# Patient Record
Sex: FEMALE | Race: BLACK OR AFRICAN AMERICAN | NOT HISPANIC OR LATINO | URBAN - METROPOLITAN AREA
[De-identification: names, ages, dates, MRNs, and addresses within clinical notes are randomized per-mention and may not be internally consistent; named-entity substitution may affect disease eponyms.]

---

## 2021-08-04 ENCOUNTER — EMERGENCY (EMERGENCY)
Facility: HOSPITAL | Age: 61
LOS: 1 days | Discharge: HOME | End: 2021-08-04
Attending: EMERGENCY MEDICINE | Admitting: EMERGENCY MEDICINE
Payer: COMMERCIAL

## 2021-08-04 VITALS
OXYGEN SATURATION: 100 % | SYSTOLIC BLOOD PRESSURE: 115 MMHG | DIASTOLIC BLOOD PRESSURE: 81 MMHG | RESPIRATION RATE: 18 BRPM | TEMPERATURE: 98 F | HEART RATE: 71 BPM

## 2021-08-04 LAB
ALBUMIN SERPL ELPH-MCNC: 4.2 G/DL — SIGNIFICANT CHANGE UP (ref 3.3–5)
ALP SERPL-CCNC: 78 U/L — SIGNIFICANT CHANGE UP (ref 40–120)
ALT FLD-CCNC: 21 U/L — SIGNIFICANT CHANGE UP (ref 4–33)
ANION GAP SERPL CALC-SCNC: 13 MMOL/L — SIGNIFICANT CHANGE UP (ref 7–14)
APTT BLD: 31.2 SEC — SIGNIFICANT CHANGE UP (ref 27–36.3)
AST SERPL-CCNC: 23 U/L — SIGNIFICANT CHANGE UP (ref 4–32)
BILIRUB SERPL-MCNC: 0.3 MG/DL — SIGNIFICANT CHANGE UP (ref 0.2–1.2)
BLD GP AB SCN SERPL QL: NEGATIVE — SIGNIFICANT CHANGE UP
BUN SERPL-MCNC: 16 MG/DL — SIGNIFICANT CHANGE UP (ref 7–23)
CALCIUM SERPL-MCNC: 9.2 MG/DL — SIGNIFICANT CHANGE UP (ref 8.4–10.5)
CHLORIDE SERPL-SCNC: 102 MMOL/L — SIGNIFICANT CHANGE UP (ref 98–107)
CO2 SERPL-SCNC: 25 MMOL/L — SIGNIFICANT CHANGE UP (ref 22–31)
CREAT SERPL-MCNC: 0.84 MG/DL — SIGNIFICANT CHANGE UP (ref 0.5–1.3)
GLUCOSE SERPL-MCNC: 105 MG/DL — HIGH (ref 70–99)
HCT VFR BLD CALC: 39.1 % — SIGNIFICANT CHANGE UP (ref 34.5–45)
HGB BLD-MCNC: 13.1 G/DL — SIGNIFICANT CHANGE UP (ref 11.5–15.5)
INR BLD: 1.05 RATIO — SIGNIFICANT CHANGE UP (ref 0.88–1.16)
MCHC RBC-ENTMCNC: 24.9 PG — LOW (ref 27–34)
MCHC RBC-ENTMCNC: 33.5 GM/DL — SIGNIFICANT CHANGE UP (ref 32–36)
MCV RBC AUTO: 74.2 FL — LOW (ref 80–100)
NRBC # BLD: 0 /100 WBCS — SIGNIFICANT CHANGE UP
NRBC # FLD: 0 K/UL — SIGNIFICANT CHANGE UP
PLATELET # BLD AUTO: 295 K/UL — SIGNIFICANT CHANGE UP (ref 150–400)
POTASSIUM SERPL-MCNC: 3.7 MMOL/L — SIGNIFICANT CHANGE UP (ref 3.5–5.3)
POTASSIUM SERPL-SCNC: 3.7 MMOL/L — SIGNIFICANT CHANGE UP (ref 3.5–5.3)
PROT SERPL-MCNC: 7.8 G/DL — SIGNIFICANT CHANGE UP (ref 6–8.3)
PROTHROM AB SERPL-ACNC: 11.9 SEC — SIGNIFICANT CHANGE UP (ref 10.6–13.6)
RBC # BLD: 5.27 M/UL — HIGH (ref 3.8–5.2)
RBC # FLD: 15 % — HIGH (ref 10.3–14.5)
RH IG SCN BLD-IMP: POSITIVE — SIGNIFICANT CHANGE UP
RH IG SCN BLD-IMP: POSITIVE — SIGNIFICANT CHANGE UP
SODIUM SERPL-SCNC: 140 MMOL/L — SIGNIFICANT CHANGE UP (ref 135–145)
WBC # BLD: 8.42 K/UL — SIGNIFICANT CHANGE UP (ref 3.8–10.5)
WBC # FLD AUTO: 8.42 K/UL — SIGNIFICANT CHANGE UP (ref 3.8–10.5)

## 2021-08-04 PROCEDURE — 73110 X-RAY EXAM OF WRIST: CPT | Mod: 26,RT

## 2021-08-04 PROCEDURE — 99285 EMERGENCY DEPT VISIT HI MDM: CPT

## 2021-08-04 PROCEDURE — 73130 X-RAY EXAM OF HAND: CPT | Mod: 26,RT

## 2021-08-04 RX ORDER — ACETAMINOPHEN 500 MG
975 TABLET ORAL ONCE
Refills: 0 | Status: COMPLETED | OUTPATIENT
Start: 2021-08-04 | End: 2021-08-04

## 2021-08-04 RX ORDER — CEPHALEXIN 500 MG
1 CAPSULE ORAL
Qty: 28 | Refills: 0
Start: 2021-08-04 | End: 2021-08-10

## 2021-08-04 RX ADMIN — Medication 975 MILLIGRAM(S): at 16:20

## 2021-08-04 NOTE — ED PROVIDER NOTE - PHYSICAL EXAMINATION
GENERAL: well appearing in no acute distress, non-toxic appearing  HEAD: normocephalic, atraumatic  HEENT: normal conjunctiva, oral mucosa moist, uvula midline, no tonsilar exudates, neck supple, no JVD  CARDIAC: regular rate and rhythm, normal S1S2, no appreciable murmurs, 2+ pulses in UE/LE b/l  PULM: normal breath sounds, clear to ascultation bilaterally, no rales, rhonchi, wheezing  GI: abdomen nondistended, soft, nontender, no guarding, rebound tenderness  : no CVA tenderness b/l, no suprapubic tenderness  NEURO: left thumb numbness,, CN2-12 intact, normal speech, PERRLA, EOMI, normal gait, AAOx3  MSK: no peripheral edema, no calf tenderness b/l  SKIN: left palm laceration, approx 3x4 cm with deep tissue exposed.   PSYCH: appropriate mood and affect

## 2021-08-04 NOTE — ED PROVIDER NOTE - ATTENDING CONTRIBUTION TO CARE
Grover: I have seen and examined the patient face to face, have reviewed and addended the HPI, PE and a/p as necessary.     59 yo F with no PMH a/w R palm laceration after accidentally dropping a glass bottle and landing onto the piece of glass after slipping.  Pt reports L thumb numbness.  Denies LOC, dizziness, chest pain, shortness of breath, head trauma, nausea or vomiting.     GEN - NAD; well appearing; A+O x3; non-toxic appearing  CARD -s1s2, RRR, no M,G,R;   PULM - CTA b/l, symmetric breath sounds;   ABD -  +BS, ND, NT, soft, no guarding, no rebound, no masses;   BACK - no CVA tenderness, Normal  spine;   EXT - symmetric pulses, 2+ dp, capillary refill < 2 seconds, no cyanosis, no edema; SKIN: left palm laceration, approx 3x4 cm with deep tissue exposed, thenar mm noted to be exposed with decreased sensation over the thumb  NEURO - no focal neuro deficits, no slurred speech    59 yo F with no PMH a/w R palm laceration after accidentally dropping a glass bottle and landing onto the piece of glass after slipping. Hand surgery eval given possible nerve injury.

## 2021-08-04 NOTE — ED PROVIDER NOTE - PATIENT PORTAL LINK FT
You can access the FollowMyHealth Patient Portal offered by [facility] by registering at the following website: [email or web address]. By joining [employer]’s FollowMyHealth portal, you will also be able to view your health information using other applications (apps) compatible with our system.

## 2021-08-04 NOTE — ED PROVIDER NOTE - PROGRESS NOTE DETAILS
Hand Surgery consult seen. Wound will require elective operative repair. Will get pre-op work-up sent.

## 2021-08-04 NOTE — ED PROVIDER NOTE - CLINICAL SUMMARY MEDICAL DECISION MAKING FREE TEXT BOX
60F with no PMHx presents with left palm laceration. Glass juice bottle fell and broke, patient slipped on juice, fell onto a piece of glass with her out-stretched hand.  Denies headstrike, LOC, dizziness. Large laceration likely will require Hand surgery consult, r/o fracture, r/o foreign body, will get imaging, give pain meds, call Hand consult, and reassess. no (get order)

## 2021-08-04 NOTE — ED PROVIDER NOTE - NS ED ROS FT
General: denies fever, chills  HENT: denies nasal congestion, rhinorrhea  Eyes: denies visual changes, blurred vision  CV: denies chest pain, palpitations  Resp: denies difficulty breathing, cough  Abdominal: denies nausea, vomiting, diarrhea, abdominal pain  : denies urinary pain or discharge  MSK: denies muscle aches, leg swelling  Neuro: denies headaches, + numbness of left thumb, denies tingling  Skin: large laceration to left palm with pain

## 2021-08-04 NOTE — ED ADULT NURSE NOTE - OBJECTIVE STATEMENT
Pt received to room 5 presents with rt hand laceration s/p mechanical fall in kitchen. 20G Iv placed in left arm, labs drawn and sent. will continue to monitor.

## 2021-08-04 NOTE — ED PROVIDER NOTE - CARE PROVIDER_API CALL
Barak Dee)  Plastic Surgery  [address]  [place] [address]  Phone: [phone]  Fax: [phone]  Follow Up Time: 1-3 Days

## 2021-08-04 NOTE — ED PROVIDER NOTE - NSFOLLOWUPINSTRUCTIONS_ED_ALL_ED_FT
Please follow up with your Hand Surgeon Dr. Barak Dee. Please call the number below to schedule your appointment.     Laceration    A laceration is a cut that goes through all of the layers of the skin and into the tissue that is right under the skin.     SEEK IMMEDIATE MEDICAL CARE IF YOU HAVE ANY OF THE FOLLOWING SYMPTOMS: swelling around the wound, worsening pain, drainage from the wound, red streaking going away from your wound, inability to move finger or toe near the laceration, or discoloration of skin near the laceration.

## 2021-08-04 NOTE — ED ADULT TRIAGE NOTE - CHIEF COMPLAINT QUOTE
Pt states cut her the palm of her right hand with glass. Pt with deep laceration to hand , pressure dressing applied over site.

## 2021-08-05 LAB — SARS-COV-2 RNA SPEC QL NAA+PROBE: SIGNIFICANT CHANGE UP

## 2021-08-06 ENCOUNTER — OUTPATIENT (OUTPATIENT)
Dept: OUTPATIENT SERVICES | Facility: HOSPITAL | Age: 61
LOS: 1 days | End: 2021-08-06
Payer: COMMERCIAL

## 2021-08-06 VITALS
TEMPERATURE: 98 F | DIASTOLIC BLOOD PRESSURE: 80 MMHG | WEIGHT: 268.96 LBS | SYSTOLIC BLOOD PRESSURE: 140 MMHG | HEIGHT: 63.5 IN | RESPIRATION RATE: 16 BRPM | HEART RATE: 70 BPM | OXYGEN SATURATION: 98 %

## 2021-08-06 DIAGNOSIS — Z98.891 HISTORY OF UTERINE SCAR FROM PREVIOUS SURGERY: Chronic | ICD-10-CM

## 2021-08-06 DIAGNOSIS — S61.011A LACERATION WITHOUT FOREIGN BODY OF RIGHT THUMB WITHOUT DAMAGE TO NAIL, INITIAL ENCOUNTER: ICD-10-CM

## 2021-08-06 DIAGNOSIS — Z01.818 ENCOUNTER FOR OTHER PREPROCEDURAL EXAMINATION: ICD-10-CM

## 2021-08-06 DIAGNOSIS — S66.021S: ICD-10-CM

## 2021-08-06 PROBLEM — Z00.00 ENCOUNTER FOR PREVENTIVE HEALTH EXAMINATION: Status: ACTIVE | Noted: 2021-08-06

## 2021-08-06 PROCEDURE — 93010 ELECTROCARDIOGRAM REPORT: CPT

## 2021-08-06 NOTE — H&P PST ADULT - NSICDXPROBLEM_GEN_ALL_CORE_FT
PROBLEM DIAGNOSES  Problem: Laceration of right thumb  Assessment and Plan: Pt. is scheduled for repair flexor tendon and microscopic nerve repair right thumb 8/10/21.  Pt. verbalized understanding of instructions.  Pt. has a BMI of 46.9.  OR booking notified.  Discussed with Dr. Nunez who confirmed that it can be done at [facility].

## 2021-08-06 NOTE — H&P PST ADULT - HISTORY OF PRESENT ILLNESS
Pt. is a 59 yo female that slipped and fell on glass resulting in a laceration to her right thumb affecting the nerve.

## 2021-08-06 NOTE — H&P PST ADULT - NSICDXPASTMEDICALHX_GEN_ALL_CORE_FT
PAST MEDICAL HISTORY:  Laceration of right thumb      PAST MEDICAL HISTORY:  Laceration of right thumb     Morbidly obese

## 2021-08-06 NOTE — H&P PST ADULT - NSICDXFAMILYHX_GEN_ALL_CORE_FT
FAMILY HISTORY:  Mother  Still living? No  Family history of colon cancer in mother, Age at diagnosis: Age Unknown

## 2021-08-07 ENCOUNTER — APPOINTMENT (OUTPATIENT)
Dept: DISASTER EMERGENCY | Facility: CLINIC | Age: 61
End: 2021-08-07

## 2021-08-08 LAB — SARS-COV-2 N GENE NPH QL NAA+PROBE: NOT DETECTED

## 2021-08-09 ENCOUNTER — TRANSCRIPTION ENCOUNTER (OUTPATIENT)
Age: 61
End: 2021-08-09

## 2021-08-09 VITALS
WEIGHT: 268.96 LBS | OXYGEN SATURATION: 98 % | SYSTOLIC BLOOD PRESSURE: 140 MMHG | HEART RATE: 70 BPM | TEMPERATURE: 98 F | DIASTOLIC BLOOD PRESSURE: 80 MMHG | HEIGHT: 63.5 IN | RESPIRATION RATE: 16 BRPM

## 2021-08-10 ENCOUNTER — OUTPATIENT (OUTPATIENT)
Dept: OUTPATIENT SERVICES | Facility: HOSPITAL | Age: 61
LOS: 1 days | Discharge: ANOTHER HOSPITAL | End: 2021-08-10

## 2021-08-10 VITALS
RESPIRATION RATE: 16 BRPM | HEART RATE: 60 BPM | SYSTOLIC BLOOD PRESSURE: 115 MMHG | TEMPERATURE: 97 F | DIASTOLIC BLOOD PRESSURE: 57 MMHG | OXYGEN SATURATION: 98 %

## 2021-08-10 DIAGNOSIS — S66.021S: ICD-10-CM

## 2021-08-10 DIAGNOSIS — Z98.891 HISTORY OF UTERINE SCAR FROM PREVIOUS SURGERY: Chronic | ICD-10-CM

## 2021-08-10 RX ORDER — OXYCODONE HYDROCHLORIDE 5 MG/1
1 TABLET ORAL
Qty: 12 | Refills: 0
Start: 2021-08-10 | End: 2021-08-11

## 2021-08-10 NOTE — ASU DISCHARGE PLAN (ADULT/PEDIATRIC) - NURSING INSTRUCTIONS
Keep hand elevated in order to decrease swelling and pain.  No creams, lotions, powders  or ointments to incision site.   Narcotic pain medication may cause nausea or constipation. Take medication with food. Increase fluids and fiber intake.

## 2021-08-10 NOTE — ASU DISCHARGE PLAN (ADULT/PEDIATRIC) - ASU DC SPECIAL INSTRUCTIONSFT
Please follow up with Dr. Dee on Friday 8/13/21 . You may call [phone] to schedule an appointment.     Please keep your dressing on, clean and dry. The splint will be removed at your follow up appointment.    Please take your pain medication sent to your pharmacy as directed for severe pain. You may also take Tylenol as directed for pain.

## 2021-08-10 NOTE — BRIEF OPERATIVE NOTE - NSICDXBRIEFPOSTOP_GEN_ALL_CORE_FT
POST-OP DIAGNOSIS:  Flexor tendon laceration, finger, open wound 10-Aug-2021 15:02:31  Deven Bills

## 2021-08-10 NOTE — ASU DISCHARGE PLAN (ADULT/PEDIATRIC) - CARE PROVIDER_API CALL
Barak Dee)  Plastic Surgery  [address]  [place] [address]  Phone: [phone]  Fax: [phone]  Follow Up Time:

## 2023-09-05 PROBLEM — E66.01 MORBID (SEVERE) OBESITY DUE TO EXCESS CALORIES: Chronic | Status: ACTIVE | Noted: 2021-08-06

## 2023-09-05 PROBLEM — S61.011A LACERATION WITHOUT FOREIGN BODY OF RIGHT THUMB WITHOUT DAMAGE TO NAIL, INITIAL ENCOUNTER: Chronic | Status: ACTIVE | Noted: 2021-08-06

## 2023-09-19 ENCOUNTER — APPOINTMENT (OUTPATIENT)
Dept: GYNECOLOGIC ONCOLOGY | Facility: CLINIC | Age: 63
End: 2023-09-19
Payer: MEDICAID

## 2023-09-19 VITALS
SYSTOLIC BLOOD PRESSURE: 139 MMHG | HEIGHT: 65 IN | DIASTOLIC BLOOD PRESSURE: 85 MMHG | HEART RATE: 91 BPM | WEIGHT: 271 LBS | BODY MASS INDEX: 45.15 KG/M2

## 2023-09-19 DIAGNOSIS — Z87.39 PERSONAL HISTORY OF OTHER DISEASES OF THE MUSCULOSKELETAL SYSTEM AND CONNECTIVE TISSUE: ICD-10-CM

## 2023-09-19 PROCEDURE — 58100 BIOPSY OF UTERUS LINING: CPT

## 2023-09-19 PROCEDURE — 99205 OFFICE O/P NEW HI 60 MIN: CPT | Mod: 25

## 2023-09-19 RX ORDER — CHROMIUM 200 MCG
TABLET ORAL
Refills: 0 | Status: ACTIVE | COMMUNITY

## 2023-09-29 LAB — CORE LAB BIOPSY: NORMAL

## 2023-09-30 ENCOUNTER — OUTPATIENT (OUTPATIENT)
Dept: OUTPATIENT SERVICES | Facility: HOSPITAL | Age: 63
LOS: 1 days | End: 2023-09-30
Payer: MEDICAID

## 2023-09-30 ENCOUNTER — APPOINTMENT (OUTPATIENT)
Dept: ULTRASOUND IMAGING | Facility: CLINIC | Age: 63
End: 2023-09-30
Payer: MEDICAID

## 2023-09-30 DIAGNOSIS — N95.0 POSTMENOPAUSAL BLEEDING: ICD-10-CM

## 2023-09-30 DIAGNOSIS — Z98.891 HISTORY OF UTERINE SCAR FROM PREVIOUS SURGERY: Chronic | ICD-10-CM

## 2023-09-30 DIAGNOSIS — R93.89 ABNORMAL FINDINGS ON DIAGNOSTIC IMAGING OF OTHER SPECIFIED BODY STRUCTURES: ICD-10-CM

## 2023-09-30 PROCEDURE — 76856 US EXAM PELVIC COMPLETE: CPT | Mod: 26

## 2023-09-30 PROCEDURE — 76830 TRANSVAGINAL US NON-OB: CPT | Mod: 26

## 2023-09-30 PROCEDURE — 76856 US EXAM PELVIC COMPLETE: CPT

## 2023-09-30 PROCEDURE — 76830 TRANSVAGINAL US NON-OB: CPT

## 2023-10-24 ENCOUNTER — APPOINTMENT (OUTPATIENT)
Dept: GYNECOLOGIC ONCOLOGY | Facility: CLINIC | Age: 63
End: 2023-10-24
Payer: MEDICAID

## 2023-10-24 VITALS
SYSTOLIC BLOOD PRESSURE: 162 MMHG | BODY MASS INDEX: 46.23 KG/M2 | HEIGHT: 65 IN | WEIGHT: 277.5 LBS | DIASTOLIC BLOOD PRESSURE: 121 MMHG | HEART RATE: 60 BPM

## 2023-10-24 DIAGNOSIS — R30.0 DYSURIA: ICD-10-CM

## 2023-10-24 PROCEDURE — 99214 OFFICE O/P EST MOD 30 MIN: CPT

## 2023-10-25 LAB
APPEARANCE: CLEAR
BACTERIA: NEGATIVE /HPF
BILIRUBIN URINE: NEGATIVE
BLOOD URINE: NEGATIVE
CAST: 0 /LPF
COLOR: YELLOW
EPITHELIAL CELLS: 5 /HPF
GLUCOSE QUALITATIVE U: NEGATIVE MG/DL
KETONES URINE: NEGATIVE MG/DL
LEUKOCYTE ESTERASE URINE: NEGATIVE
MICROSCOPIC-UA: NORMAL
NITRITE URINE: NEGATIVE
PH URINE: 6.5
PROTEIN URINE: NEGATIVE MG/DL
RED BLOOD CELLS URINE: 2 /HPF
SPECIFIC GRAVITY URINE: 1.03
UROBILINOGEN URINE: 1 MG/DL
WHITE BLOOD CELLS URINE: 1 /HPF

## 2023-11-22 ENCOUNTER — APPOINTMENT (OUTPATIENT)
Dept: GYNECOLOGIC ONCOLOGY | Facility: HOSPITAL | Age: 63
End: 2023-11-22

## 2023-11-29 ENCOUNTER — NON-APPOINTMENT (OUTPATIENT)
Age: 63
End: 2023-11-29

## 2023-12-19 ENCOUNTER — APPOINTMENT (OUTPATIENT)
Dept: GYNECOLOGIC ONCOLOGY | Facility: CLINIC | Age: 63
End: 2023-12-19

## 2024-01-02 ENCOUNTER — NON-APPOINTMENT (OUTPATIENT)
Age: 64
End: 2024-01-02

## 2024-01-02 ENCOUNTER — APPOINTMENT (OUTPATIENT)
Dept: GYNECOLOGIC ONCOLOGY | Facility: CLINIC | Age: 64
End: 2024-01-02

## 2024-01-02 NOTE — HISTORY OF PRESENT ILLNESS
[FreeTextEntry1] : GYN/Ref:  Dr. Strauss PCP:  Teetee Grubbs MD  Ms. Baig, 63 years old, referred for a thickened endometrium and postmenopausal bleeding. Pt had an isolated episode of PMB in . She followed up with her primary GYN who ordered a TVUS in  and identified thickened endometrium. She has been unable to follow up with her GYN due to scheduling difficulties and she was ultimately referred to GYN Onc for further management. At her initial visit an endometrial biopsy was performed and final pathology returned revealing findings consistent with an endometrial polyp. This pathology was discussed in detail with the patient as well as the limitations of endometrial biopsy. We discussed the recommendation for hysteroscopy dilation and curettage and endometrial polyp removal in detail. Today she is without any complaint. She denies any current postmenopausal bleeding.  Patient was scheduled for surgery on 2023.  However, she canceled.  I did call her to follow-up and there was no answer.  She presents today for further management.  Pathology: 2023: EMB: - Consistent with endometrial polyp. - Background of inactive endometrium. - Benign endocervical lining  Imagin2023 TVUS ([facility]) Uterus: 6.6 cm x 3.8 cm x 5.0 cm. The myometrium is heterogeneous. There appears be a pedunculated myoma measuring 5.2 x 5.4 x 5.3 cm Endometrium: 16 mm. The endometrium is thickened with cystic change. Right ovary: 2.3 cm x 1.6 cm x 1.3 cm. Within normal limits. Left ovary: 2.4 cm x 1.2 cm x 1.7 cm. Within normal limits. Fluid: None. IMPRESSION: Thickened endometrium measuring 16 mm in size with cystic change without focal abnormality. Tissue sampling should be considered. Heterogeneous myometrium with a pedunculated 5.2 x 5.4 x 5.3 cm myoma  4/3/2023 TVUS ([facility]) UTERUS: The uterus is anteverted measuring 7.1 x 3.8 x 5.0 cm (71 cc). There is a solid isoechoic mass seen contiguous to the uterine fundus and measuring 5.2 x 5.4 x 5.1 cm. Probable pedunculated fibroid. There is an intramural posterior lower uterine segment fibroid of 0.8 x 0.7 x 0.8 cm. ENDOMETRIUM: Abnormally thickened 0.9 cm in maximum thickness. Multiple tiny cystic spaces seen within the otherwise echogenic endometrium. RIGHT OVARY: Normal size, morphology and vascularity; 2.3 x 1.9 x 1.3 cm (3 cc).  LEFT OVARY: Not visualized. FREE FLUID: None. IMPRESSION:   1. Abnormally thickened endometrium for postmenopausal patient. Rule out endometrial pathology. 2. Solid mass seen contiguous to the uterine fundus. Probable pedunculated fibroid. Solid mass of other etiology cannot be excluded. Recommend correlation with pelvic MRI pre- and post-IV contrast. 3. Small intramural fibroid. 4. Left ovary was not visualized.  POB:   (2 vaginal deliveries; 1 ). Children are ages 42, 38 and 25 PGYN: Menarche age 12;  LMP age 45 (denies any prior pmb) PMH: b/l knee arthritis Meds: Vitamin D Allergies: Excedrin (facial swelling).  Pt states she can tolerate aspirin.  PSH:  right thumb tendon repair s/p tendon tear after falling on a glass jar she took out of the refrigerator;   .  Social Hx: Non-smoker; rarely consumes alcohol; denies marijuana or any other drugs.  Lives with daugther.   FH: Unknown family history (Pt is adopted)  Health Maintenance: Mammogram: 2023 BI-RADS 2 ([facility]) Colonoscopy:  (due now) PAP: 23 - Negative for intraepithelial lesion or malignancy.

## 2024-01-02 NOTE — ASSESSMENT
[FreeTextEntry1] : 63-year-old here for follow-up to discuss results and management plan for postmenopausal bleeding.

## 2024-01-02 NOTE — DISCUSSION/SUMMARY
[FreeTextEntry1] : - Patients history and work up to date reviewed in detail. - At her initial visit an endometrial biopsy was performed and final pathology returned revealing findings consistent with an endometrial polyp.  This pathology was discussed in detail with the patient as well as the limitations of endometrial biopsy.  We discussed the recommendation for hysteroscopy dilation and curettage and endometrial polyp removal in detail.  Today she is without any complaint.  She denies any current postmenopausal bleeding. -Additionally we reviewed her pelvic sonogram which revealed a thickened endometrium. -We discussed risks and benefits of surgery as well as the typical post operative course.  We also discussed potential alternatives to surgery. Ample time was allowed for questions to be asked and solicited.  All were answered and the patient expressed understanding.    We discussed the risks of surgery which include, but are not limited to:  -Bleeding that may require a blood transfusion  -Infection of the surgical incision sites, lungs, urine, kidneys or IV site that may can compromise healing and require IV antibiotics  -Injury to surrounding structures including the bladder, bowel, ureters, urethra, blood vessels, or nerves that may result in a loss of function or sensation.  -Blood clots in the extremities or lungs, which may lead to long term anticoagulation and difficulty breathing -The need for more surgery  After our discussion, all questions were again answered. She is aware of the risks and benefits and would like to proceed.  Patient will be seen by our anesthesia colleagues in pre-admission testing and have preoperative labs drawn.  She is aware she will need perioperative optimization recommendations and medical clearance from her PCP. -Urine culture sent given complaint of dysuria to follow-up results and treat as clinically indicated. - Booking form placed. Patient prefers to have her case done at [facility]. My booking team will reach out to her to schedule.   - I spent the time noted on the day of this patient encounter preparing for, providing and documenting the above service. I have counseled and educated the patient on the differential, workup, disease course, and treatment/management plan. Education was provided to the patient during this encounter. All questions and concerns were answered and addressed in detail.

## 2024-01-02 NOTE — REASON FOR VISIT
[FreeTextEntry1] : PATIENT NOT SEEN on 1/2/2024 NOTE ONLY ABSTRACTED   Thickened endometrium; postmenopausal bleeding

## 2024-02-13 ENCOUNTER — APPOINTMENT (OUTPATIENT)
Dept: GYNECOLOGIC ONCOLOGY | Facility: CLINIC | Age: 64
End: 2024-02-13

## 2024-02-26 PROBLEM — R93.89 THICKENED ENDOMETRIUM: Status: ACTIVE | Noted: 2023-09-18

## 2024-02-26 PROBLEM — N95.0 POSTMENOPAUSAL BLEEDING: Status: ACTIVE | Noted: 2023-09-18

## 2024-02-26 NOTE — OB HISTORY
[Total Preg ___] : : [unfilled] [Living ___] : [unfilled] (living) [Vaginal ___] : [unfilled] vaginal delivery(s) [ ___] : [unfilled]  section delivery(s) [Menarche Age ____] : age at menarche was [unfilled] [Menopause  Age ____] : menopause occurred at age [unfilled]

## 2024-02-27 ENCOUNTER — APPOINTMENT (OUTPATIENT)
Dept: GYNECOLOGIC ONCOLOGY | Facility: CLINIC | Age: 64
End: 2024-02-27
Payer: COMMERCIAL

## 2024-02-27 VITALS
HEIGHT: 65 IN | WEIGHT: 273.38 LBS | SYSTOLIC BLOOD PRESSURE: 141 MMHG | HEART RATE: 103 BPM | BODY MASS INDEX: 45.55 KG/M2 | DIASTOLIC BLOOD PRESSURE: 84 MMHG

## 2024-02-27 DIAGNOSIS — N89.8 OTHER SPECIFIED NONINFLAMMATORY DISORDERS OF VAGINA: ICD-10-CM

## 2024-02-27 DIAGNOSIS — R93.89 ABNORMAL FINDINGS ON DIAGNOSTIC IMAGING OF OTHER SPECIFIED BODY STRUCTURES: ICD-10-CM

## 2024-02-27 DIAGNOSIS — N95.0 POSTMENOPAUSAL BLEEDING: ICD-10-CM

## 2024-02-27 PROCEDURE — 99214 OFFICE O/P EST MOD 30 MIN: CPT

## 2024-02-27 NOTE — PHYSICAL EXAM
[Chaperone Present] : A chaperone was present in the examining room during all aspects of the physical examination [Normal] : Anus and perineum: Normal sphincter tone, no masses, no prolapse. [Fully active, able to carry on all pre-disease performance without restriction] : Status 0 - Fully active, able to carry on all pre-disease performance without restriction [FreeTextEntry1] : Chelsey ADAMSON [de-identified] : soft, obese [de-identified] : white vaginal discharge coating the vagina with no erythema

## 2024-02-27 NOTE — DISCUSSION/SUMMARY
[Reviewed Clinical Lab Test(s)] : Results of clinical tests were reviewed. [Reviewed Radiology Report(s)] : Radiology reports were reviewed. [Discuss Tests w/Referring Providers] : Results of labs/radiology studies and the treatment recommendations were discussed with performing/referring physician. [Discuss Alternatives/Risks/Benefits w/Patient] : All alternatives, risks, and benefits were discussed with the patient/family and all questions were answered.  Patient expressed good understanding and appreciates the importance of follow up as recommended. [Visit Time ___ Minutes] : [unfilled] minutes [Face to Face Time___ Minutes] : with [unfilled] minutes in face to face consultation. [FreeTextEntry1] : - Patients history and work up to date reviewed in detail. - At her initial visit an endometrial biopsy was performed and final pathology returned revealing findings consistent with an endometrial polyp.  This pathology was discussed in detail with the patient as well as the limitations of endometrial biopsy.  We discussed the recommendation for hysteroscopy dilation and curettage and endometrial polyp removal in detail.  She is amenable to this and would like to schedule it as soon as feasible. -Booking form placed and my team will work to accommodate the next available OR date that works for the patient.  -Additionally we reviewed her pelvic sonogram which revealed a thickened endometrium and her MRI which revealed that ?pedunculated fibroid. Plan for surgery and then repeat MRI pelvis.  -We again discussed risks and benefits of surgery as well as the typical post operative course.  We also discussed potential alternatives to surgery. Ample time was allowed for questions to be asked and solicited.  All were answered and the patient expressed understanding.    We discussed the risks of surgery which include, but are not limited to:  -Bleeding that may require a blood transfusion  -Infection of the surgical incision sites, lungs, urine, kidneys or IV site that may can compromise healing and require IV antibiotics  -Injury to surrounding structures including the bladder, bowel, ureters, urethra, blood vessels, or nerves that may result in a loss of function or sensation.  -Blood clots in the extremities or lungs, which may lead to long term anticoagulation and difficulty breathing -The need for more surgery  After our discussion, all questions were again answered. She is aware of the risks and benefits and would like to proceed.  Patient will be seen by our anesthesia colleagues in pre-admission testing and have preoperative labs drawn.  She is aware she will need perioperative optimization recommendations and medical clearance from her PCP. -Urine culture sent given complaint of dysuria to follow-up results and treat as clinically indicated. - Booking form placed. Patient prefers to have her case done at [facility]. My booking team will reach out to her to schedule. - I spent the time noted on the day of this patient encounter preparing for, providing and documenting the above service. I have counseled and educated the patient on the differential, workup, disease course, and treatment/management plan. Education was provided to the patient during this encounter. All questions and concerns were answered and addressed in detail.

## 2024-02-27 NOTE — HISTORY OF PRESENT ILLNESS
[FreeTextEntry1] : GYN/Ref:  Dr. Strauss PCP:  Teetee Grubbs MD  Ms. Baig, 63 years old, referred for a thickened endometrium and postmenopausal bleeding. Pt had an isolated episode of PMB in . She followed up with her primary GYN who ordered a TVUS in  and identified thickened endometrium. She has been unable to follow up with her GYN due to scheduling difficulties and she was ultimately referred to GYN Onc for further management. At her initial visit an endometrial biopsy was performed and final pathology returned revealing findings consistent with an endometrial polyp. This pathology was discussed in detail with the patient as well as the limitations of endometrial biopsy. We discussed the recommendation for hysteroscopy dilation and curettage and endometrial polyp removal in detail. Today she is without any complaint. She denies any current postmenopausal bleeding.  Patient was scheduled for surgery on 2023.  However, she canceled.  I did call her to follow-up and there was no answer.  She presents today for further management. She notes she is doing overall well. Denies any vaginal bleeding but is concerned about some malodorous vaginal discharge. Last UA sent in the fall and negative. Denies any dysuria.   Pathology: 2023: EMB: - Consistent with endometrial polyp. - Background of inactive endometrium. - Benign endocervical lining  Imagin2023 TVUS ([facility]) Uterus: 6.6 cm x 3.8 cm x 5.0 cm. The myometrium is heterogeneous. There appears be a pedunculated myoma measuring 5.2 x 5.4 x 5.3 cm Endometrium: 16 mm. The endometrium is thickened with cystic change. Right ovary: 2.3 cm x 1.6 cm x 1.3 cm. Within normal limits. Left ovary: 2.4 cm x 1.2 cm x 1.7 cm. Within normal limits. Fluid: None. IMPRESSION: Thickened endometrium measuring 16 mm in size with cystic change without focal abnormality. Tissue sampling should be considered. Heterogeneous myometrium with a pedunculated 5.2 x 5.4 x 5.3 cm myoma  4/3/2023 TVUS ([facility]) UTERUS: The uterus is anteverted measuring 7.1 x 3.8 x 5.0 cm (71 cc). There is a solid isoechoic mass seen contiguous to the uterine fundus and measuring 5.2 x 5.4 x 5.1 cm. Probable pedunculated fibroid. There is an intramural posterior lower uterine segment fibroid of 0.8 x 0.7 x 0.8 cm. ENDOMETRIUM: Abnormally thickened 0.9 cm in maximum thickness. Multiple tiny cystic spaces seen within the otherwise echogenic endometrium. RIGHT OVARY: Normal size, morphology and vascularity; 2.3 x 1.9 x 1.3 cm (3 cc).  LEFT OVARY: Not visualized. FREE FLUID: None. IMPRESSION:   1. Abnormally thickened endometrium for postmenopausal patient. Rule out endometrial pathology. 2. Solid mass seen contiguous to the uterine fundus. Probable pedunculated fibroid. Solid mass of other etiology cannot be excluded. Recommend correlation with pelvic MRI pre- and post-IV contrast. 3. Small intramural fibroid. 4. Left ovary was not visualized.  POB:   (2 vaginal deliveries; 1 ). Children are ages 42, 38 and 25 PGYN: Menarche age 12;  LMP age 45 (denies any prior pmb) PMH: b/l knee arthritis Meds: Vitamin D Allergies: Excedrin (facial swelling).  Pt states she can tolerate aspirin.  PSH:  right thumb tendon repair s/p tendon tear after falling on a glass jar she took out of the refrigerator;   .  Social Hx: Non-smoker; rarely consumes alcohol; denies marijuana or any other drugs.  Lives with daugther.   FH: Unknown family history (Pt is adopted)  Health Maintenance: Mammogram: 2023 BI-RADS 2 ([facility]) Colonoscopy:  (due now) PAP: 23 - Negative for intraepithelial lesion or malignancy.

## 2024-02-28 LAB
CANDIDA VAG CYTO: NOT DETECTED
G VAGINALIS+PREV SP MTYP VAG QL MICRO: NOT DETECTED
T VAGINALIS VAG QL WET PREP: NOT DETECTED

## 2024-03-13 ENCOUNTER — OUTPATIENT (OUTPATIENT)
Dept: OUTPATIENT SERVICES | Facility: HOSPITAL | Age: 64
LOS: 1 days | End: 2024-03-13
Payer: COMMERCIAL

## 2024-03-13 VITALS
RESPIRATION RATE: 18 BRPM | HEART RATE: 78 BPM | OXYGEN SATURATION: 98 % | WEIGHT: 272.93 LBS | HEIGHT: 65 IN | DIASTOLIC BLOOD PRESSURE: 62 MMHG | TEMPERATURE: 98 F | SYSTOLIC BLOOD PRESSURE: 129 MMHG

## 2024-03-13 DIAGNOSIS — N95.0 POSTMENOPAUSAL BLEEDING: ICD-10-CM

## 2024-03-13 DIAGNOSIS — Z98.890 OTHER SPECIFIED POSTPROCEDURAL STATES: Chronic | ICD-10-CM

## 2024-03-13 DIAGNOSIS — M19.90 UNSPECIFIED OSTEOARTHRITIS, UNSPECIFIED SITE: ICD-10-CM

## 2024-03-13 DIAGNOSIS — Z01.818 ENCOUNTER FOR OTHER PREPROCEDURAL EXAMINATION: ICD-10-CM

## 2024-03-13 DIAGNOSIS — R93.89 ABNORMAL FINDINGS ON DIAGNOSTIC IMAGING OF OTHER SPECIFIED BODY STRUCTURES: ICD-10-CM

## 2024-03-13 DIAGNOSIS — Z98.891 HISTORY OF UTERINE SCAR FROM PREVIOUS SURGERY: Chronic | ICD-10-CM

## 2024-03-13 LAB
ALBUMIN SERPL ELPH-MCNC: 3.3 G/DL — LOW (ref 3.5–5)
ALP SERPL-CCNC: 76 U/L — SIGNIFICANT CHANGE UP (ref 40–120)
ALT FLD-CCNC: 24 U/L DA — SIGNIFICANT CHANGE UP (ref 10–60)
ANION GAP SERPL CALC-SCNC: 4 MMOL/L — LOW (ref 5–17)
AST SERPL-CCNC: 18 U/L — SIGNIFICANT CHANGE UP (ref 10–40)
BILIRUB SERPL-MCNC: 0.5 MG/DL — SIGNIFICANT CHANGE UP (ref 0.2–1.2)
BLD GP AB SCN SERPL QL: SIGNIFICANT CHANGE UP
BUN SERPL-MCNC: 18 MG/DL — SIGNIFICANT CHANGE UP (ref 7–18)
CALCIUM SERPL-MCNC: 9 MG/DL — SIGNIFICANT CHANGE UP (ref 8.4–10.5)
CHLORIDE SERPL-SCNC: 108 MMOL/L — SIGNIFICANT CHANGE UP (ref 96–108)
CO2 SERPL-SCNC: 30 MMOL/L — SIGNIFICANT CHANGE UP (ref 22–31)
CREAT SERPL-MCNC: 0.74 MG/DL — SIGNIFICANT CHANGE UP (ref 0.5–1.3)
EGFR: 91 ML/MIN/1.73M2 — SIGNIFICANT CHANGE UP
GLUCOSE SERPL-MCNC: 88 MG/DL — SIGNIFICANT CHANGE UP (ref 70–99)
HCT VFR BLD CALC: 36.4 % — SIGNIFICANT CHANGE UP (ref 34.5–45)
HGB BLD-MCNC: 12.4 G/DL — SIGNIFICANT CHANGE UP (ref 11.5–15.5)
INR BLD: 1.03 RATIO — SIGNIFICANT CHANGE UP (ref 0.85–1.18)
MCHC RBC-ENTMCNC: 24.9 PG — LOW (ref 27–34)
MCHC RBC-ENTMCNC: 34.1 GM/DL — SIGNIFICANT CHANGE UP (ref 32–36)
MCV RBC AUTO: 73.2 FL — LOW (ref 80–100)
NRBC # BLD: 0 /100 WBCS — SIGNIFICANT CHANGE UP (ref 0–0)
PLATELET # BLD AUTO: 243 K/UL — SIGNIFICANT CHANGE UP (ref 150–400)
POTASSIUM SERPL-MCNC: 3.5 MMOL/L — SIGNIFICANT CHANGE UP (ref 3.5–5.3)
POTASSIUM SERPL-SCNC: 3.5 MMOL/L — SIGNIFICANT CHANGE UP (ref 3.5–5.3)
PROT SERPL-MCNC: 7.3 G/DL — SIGNIFICANT CHANGE UP (ref 6–8.3)
PROTHROM AB SERPL-ACNC: 11.7 SEC — SIGNIFICANT CHANGE UP (ref 9.5–13)
RBC # BLD: 4.97 M/UL — SIGNIFICANT CHANGE UP (ref 3.8–5.2)
RBC # FLD: 16.4 % — HIGH (ref 10.3–14.5)
SODIUM SERPL-SCNC: 142 MMOL/L — SIGNIFICANT CHANGE UP (ref 135–145)
WBC # BLD: 5.21 K/UL — SIGNIFICANT CHANGE UP (ref 3.8–10.5)
WBC # FLD AUTO: 5.21 K/UL — SIGNIFICANT CHANGE UP (ref 3.8–10.5)

## 2024-03-13 NOTE — H&P PST ADULT - NSSUBSTANCEUSE_GEN_ALL_CORE_SD
Normal exam.    Resolving viral syndrome from last week with headache and dizzy. Return in 1 year or as needed. caffeine

## 2024-03-13 NOTE — H&P PST ADULT - HISTORY OF PRESENT ILLNESS
62 y/o female with PMH of Thickened Endometrium, B/L Knee Arthritis. PSH:  right thumb tendon repair s/p tendon tear after falling on a glass jar she took out of the refrigerator;  .   Pt c/o postmenopausal bleeding and was found to have endometrial polyp.    Pt presents to pre-surgical evaluation now scheduled for Examination Under Anesthesia Dilation and Curettage with Hysteroscopy and Polypectomy on 3/21/24 with Dr. Palma.                              62 y/o female with PMH of Thickened Endometrium, B/L Knee Arthritis. PSH:  right thumb tendon repair;  .   Pt c/o postmenopausal bleeding and was found to have endometrial polyp.    Pt presents to pre-surgical evaluation now scheduled for Examination Under Anesthesia Dilation and Curettage with Hysteroscopy and Polypectomy on 3/21/24 with Dr. Palma.

## 2024-03-13 NOTE — H&P PST ADULT - ASSESSMENT
62 y/o female c/o postmenopausal bleeding and was found to have endometrial polyp, now scheduled for Examination Under Anesthesia Dilation and Curettage with Hysteroscopy and Polypectomy on 3/21/24 with Dr. Palma.    Max Otoole 2

## 2024-03-13 NOTE — H&P PST ADULT - PROBLEM SELECTOR PLAN 1
Pt schedule for Examination Under Anesthesia Dilation and Curettage with Hysteroscopy and Polypectomy on 3/21/24 with Dr. Palma.    Labs drawn in PST - f/u results     Pt was  instructed to stop aspirin/ecotrin and all over the counter medication including vitamins and herbal supplements one week prior to surgery   Instructions given on the use of 4% chlorhexidine wash and Pt verbalized understanding of same   Pt Instructed to have nothing by mouth starting midnight day before surgery  Patient is to expect a phone call day before surgery between the hours of 430- 630pm giving arrival time for surgery   Written and verbal preoperative instructions given to patient with understanding verbalized.     Patient today with STOP bang score 2  Low  risk for JOHN

## 2024-03-13 NOTE — H&P PST ADULT - REASON FOR ADMISSION
Examination Under Anesthesia Dilation and Curettage with Hysteroscopy and Polypectomy on 3/21/24 with Dr. Palma

## 2024-03-13 NOTE — H&P PST ADULT - ATTENDING COMMENTS
Patient seen in ASU, no changes reported. Consent form reviewed including examination by learner. All questions answered. Case reviewed with circulating OR team.    Hilary Palma MD

## 2024-03-14 PROCEDURE — G0463: CPT

## 2024-03-20 ENCOUNTER — TRANSCRIPTION ENCOUNTER (OUTPATIENT)
Age: 64
End: 2024-03-20

## 2024-03-21 ENCOUNTER — OUTPATIENT (OUTPATIENT)
Dept: OUTPATIENT SERVICES | Facility: HOSPITAL | Age: 64
LOS: 1 days | End: 2024-03-21
Payer: COMMERCIAL

## 2024-03-21 ENCOUNTER — TRANSCRIPTION ENCOUNTER (OUTPATIENT)
Age: 64
End: 2024-03-21

## 2024-03-21 ENCOUNTER — RESULT REVIEW (OUTPATIENT)
Age: 64
End: 2024-03-21

## 2024-03-21 ENCOUNTER — APPOINTMENT (OUTPATIENT)
Dept: GYNECOLOGIC ONCOLOGY | Facility: HOSPITAL | Age: 64
End: 2024-03-21

## 2024-03-21 VITALS
WEIGHT: 272.93 LBS | HEART RATE: 77 BPM | TEMPERATURE: 98 F | DIASTOLIC BLOOD PRESSURE: 70 MMHG | OXYGEN SATURATION: 99 % | RESPIRATION RATE: 16 BRPM | SYSTOLIC BLOOD PRESSURE: 116 MMHG | HEIGHT: 65 IN

## 2024-03-21 VITALS
OXYGEN SATURATION: 99 % | DIASTOLIC BLOOD PRESSURE: 74 MMHG | SYSTOLIC BLOOD PRESSURE: 138 MMHG | HEART RATE: 86 BPM | RESPIRATION RATE: 18 BRPM | TEMPERATURE: 97 F

## 2024-03-21 DIAGNOSIS — Z98.890 OTHER SPECIFIED POSTPROCEDURAL STATES: Chronic | ICD-10-CM

## 2024-03-21 DIAGNOSIS — Z01.818 ENCOUNTER FOR OTHER PREPROCEDURAL EXAMINATION: ICD-10-CM

## 2024-03-21 DIAGNOSIS — N95.0 POSTMENOPAUSAL BLEEDING: ICD-10-CM

## 2024-03-21 DIAGNOSIS — R93.89 ABNORMAL FINDINGS ON DIAGNOSTIC IMAGING OF OTHER SPECIFIED BODY STRUCTURES: ICD-10-CM

## 2024-03-21 DIAGNOSIS — Z98.891 HISTORY OF UTERINE SCAR FROM PREVIOUS SURGERY: Chronic | ICD-10-CM

## 2024-03-21 LAB — BLD GP AB SCN SERPL QL: SIGNIFICANT CHANGE UP

## 2024-03-21 PROCEDURE — 86900 BLOOD TYPING SEROLOGIC ABO: CPT

## 2024-03-21 PROCEDURE — 58558 HYSTEROSCOPY BIOPSY: CPT

## 2024-03-21 PROCEDURE — 86850 RBC ANTIBODY SCREEN: CPT

## 2024-03-21 PROCEDURE — 36415 COLL VENOUS BLD VENIPUNCTURE: CPT

## 2024-03-21 PROCEDURE — 88305 TISSUE EXAM BY PATHOLOGIST: CPT | Mod: 26

## 2024-03-21 PROCEDURE — C1782: CPT

## 2024-03-21 PROCEDURE — 86901 BLOOD TYPING SEROLOGIC RH(D): CPT

## 2024-03-21 PROCEDURE — 88305 TISSUE EXAM BY PATHOLOGIST: CPT

## 2024-03-21 DEVICE — MYOSURE TISSUE REMOVAL DEVICE LITE: Type: IMPLANTABLE DEVICE | Status: FUNCTIONAL

## 2024-03-21 DEVICE — AVETA FLEX RESECTING DEVICE 2.9MM: Type: IMPLANTABLE DEVICE | Status: FUNCTIONAL

## 2024-03-21 DEVICE — MYOSURE TISSUE REMOVAL DEVICE XL: Type: IMPLANTABLE DEVICE | Status: FUNCTIONAL

## 2024-03-21 RX ORDER — SODIUM CHLORIDE 9 MG/ML
1000 INJECTION, SOLUTION INTRAVENOUS
Refills: 0 | Status: DISCONTINUED | OUTPATIENT
Start: 2024-03-21 | End: 2024-03-21

## 2024-03-21 RX ORDER — SODIUM CHLORIDE 9 MG/ML
3 INJECTION INTRAMUSCULAR; INTRAVENOUS; SUBCUTANEOUS EVERY 8 HOURS
Refills: 0 | Status: DISCONTINUED | OUTPATIENT
Start: 2024-03-21 | End: 2024-03-21

## 2024-03-21 RX ORDER — IBUPROFEN 200 MG
2 TABLET ORAL
Qty: 0 | Refills: 0 | DISCHARGE

## 2024-03-21 RX ORDER — IBUPROFEN 200 MG
1 TABLET ORAL
Refills: 0 | DISCHARGE

## 2024-03-21 RX ORDER — CHOLECALCIFEROL (VITAMIN D3) 125 MCG
1 CAPSULE ORAL
Qty: 0 | Refills: 0 | DISCHARGE

## 2024-03-21 RX ORDER — ACETAMINOPHEN 500 MG
3 TABLET ORAL
Qty: 0 | Refills: 0 | DISCHARGE

## 2024-03-21 RX ORDER — ACETAMINOPHEN 500 MG
1000 TABLET ORAL ONCE
Refills: 0 | Status: DISCONTINUED | OUTPATIENT
Start: 2024-03-21 | End: 2024-03-21

## 2024-03-21 RX ORDER — IBUPROFEN 200 MG
1 TABLET ORAL
Qty: 0 | Refills: 0 | DISCHARGE

## 2024-03-21 RX ORDER — FENTANYL CITRATE 50 UG/ML
25 INJECTION INTRAVENOUS
Refills: 0 | Status: DISCONTINUED | OUTPATIENT
Start: 2024-03-21 | End: 2024-03-21

## 2024-03-21 NOTE — ASU DISCHARGE PLAN (ADULT/PEDIATRIC) - ASU DC SPECIAL INSTRUCTIONSFT
Return to your regular way of eating.  Complete vaginal rest, no tampons, no douching, no tub bathing, no sexual activities for 2 weeks unless otherwise instructed by your doctor.  Call your doctor with any signs and symptoms of infection such as fever, chills, nausea or vomiting.  Call your doctor if you're unable to tolerate food or have difficulty urinating.  Call your doctor if you have pain that is not relieved by Tylenol/Motrin. Notify your doctor with any other concerns.  Follow up with Dr. Palma in 2 weeks.

## 2024-03-21 NOTE — ASU PATIENT PROFILE, ADULT - NS PRO AD INFO GIVEN Y
S/w patient regarding MRI LSP approval and authorization being valid until 02/21/2020. [id number], then contact our office for follow up appointment. MRI LSP results will not be given over the phone. Patient was also asked to allow a minimum 48 hours for follow up appointment from MRI scan in order for staff to obtain MRI report. [place] [facility] [place] Advised to contact the office if needing to reschedule this to accommodate MRI scan. Patient voiced understanding of MRI results not being given over the phone.
yes

## 2024-03-21 NOTE — BRIEF OPERATIVE NOTE - OPERATION/FINDINGS
On EUA, grossly normal external genitalia. On hysteroscopy, polypoid tissue noted throughout endometrial cavity. Ostia visualized bilaterally. Resection of tissue with [employer] resectoscope. Sharp curettage performed. Silver nitrate used for hemostasis on cervix. Execellent hemostasis at end of procedure.

## 2024-03-21 NOTE — ASU DISCHARGE PLAN (ADULT/PEDIATRIC) - CARE PROVIDER_API CALL
Hilary Palma  Gynecologic Oncology  [address] [place] [id number]  Phone: [phone]  Fax: [phone]  Established Patient  Follow Up Time: 2 weeks   190.9

## 2024-03-21 NOTE — BRIEF OPERATIVE NOTE - NSICDXBRIEFPROCEDURE_GEN_ALL_CORE_FT
PROCEDURES:  Exam under anesthesia, pelvic 21-Mar-2024 17:18:23  Venus Hoffman  Hysteroscopy, with dilation and curettage 21-Mar-2024 17:18:55  Venus Hoffman

## 2024-03-22 ENCOUNTER — NON-APPOINTMENT (OUTPATIENT)
Age: 64
End: 2024-03-22

## 2024-03-26 LAB — SURGICAL PATHOLOGY STUDY: SIGNIFICANT CHANGE UP

## 2024-04-08 PROBLEM — Z48.89 POSTOPERATIVE VISIT: Status: ACTIVE | Noted: 2024-04-08

## 2024-04-09 ENCOUNTER — APPOINTMENT (OUTPATIENT)
Dept: GYNECOLOGIC ONCOLOGY | Facility: CLINIC | Age: 64
End: 2024-04-09
Payer: COMMERCIAL

## 2024-04-09 VITALS
WEIGHT: 274 LBS | TEMPERATURE: 98.2 F | HEART RATE: 102 BPM | HEIGHT: 65 IN | BODY MASS INDEX: 45.65 KG/M2 | SYSTOLIC BLOOD PRESSURE: 107 MMHG | DIASTOLIC BLOOD PRESSURE: 63 MMHG

## 2024-04-09 DIAGNOSIS — Z48.89 ENCOUNTER FOR OTHER SPECIFIED SURGICAL AFTERCARE: ICD-10-CM

## 2024-04-09 PROCEDURE — 99212 OFFICE O/P EST SF 10 MIN: CPT

## 2024-04-09 NOTE — REASON FOR VISIT
[Post Op] : post op visit [de-identified] : 3/21/2024 [de-identified] : Hysteroscopy, D&C  [de-identified] : Denies f/c/n/v/d/vaginal bleeding.  Overall feels well.  Meeting milestones of recovery.  Regular BM and voiding freely. The patient notes she is doing great.  Final pathology reviewed in detail.  Given the preservation issues with the specimen plan for close interval follow-up with a transvaginal ultrasound in 6 months or sooner as clinically indicated.  Signs and symptoms and when to seek evaluation sooner such as with recurrent postmenopausal bleeding were reviewed in detail.  Patient expressed verbal understanding is very grateful for her care.  Final path: 1.  Endometrial curettings; dilatation and curettage: -   Endocervical mucus with chronic inflammatory cell infiltrate. -   Diagnostic endometrial tissue is not identified.  2.  Endocervical curettings; dilatation and curettage: -   Endocervical mucus with chronic inflammatory cell infiltrate. -   Diagnostic endocervical tissue is not identified.  3.  Endometrial polyp; polypectomy: -   Poorly preserved endometrial tissue most consistent with endometrial polyp.  See comment.

## 2024-04-09 NOTE — ASSESSMENT
[FreeTextEntry1] : 64 yo with PMB and thickened endometrium now s/p EUA, [facility] D&C polypectomy here for her postoperative visit.   - Patients history and work up to date reviewed in detail. - Doing well, meeting all postoperative milestones. - Postoperative recovery and expectations reviewed again in detail. - Final pathology report reviewed in detail. Given the preservation issues with the specimen plan for close interval follow-up with a transvaginal ultrasound in 6 months or sooner as clinically indicated.  Signs and symptoms and when to seek evaluation sooner such as with recurrent postmenopausal bleeding were reviewed in detail.  Patient expressed verbal understanding is very grateful for her care. - Order for TVUS placed. - For follow up in 6 months or sooner as clinically indicated.  - I spent the time noted on the day of this patient encounter preparing for, providing and documenting the above service. I have counseled and educated the patient on the differential, workup, disease course, and treatment/management plan. Education was provided to the patient during this encounter. All questions and concerns were answered and addressed in detail.

## 2024-04-09 NOTE — LETTER BODY
[Dear  ___] : Dear  [unfilled], [I recently saw our patient [unfilled] for a follow-up visit.] : I recently saw our patient, [unfilled] for a follow-up visit. [Attached please find my note.] : Attached please find my note. [FreeTextEntry2] : Patient is status post hysteroscopy D&C on 3/21/2024 [FreeTextEntry1] : Final path

## 2024-04-09 NOTE — DISCUSSION/SUMMARY
[Doing Well] : is doing well [Excellent Pain Control] : has excellent pain control [No Sign of Infection] : is showing no signs of infection [Firm] : soft [Tender] : nontender [Abnormal Bowel Sounds] : normal bowel sounds [Rebound] : no rebound tenderness [Guarding] : no guarding [Vaginal Exam Abnormal] : normal vaginal exam

## (undated) DEVICE — PACK PERI GYN

## (undated) DEVICE — DRAPE TOWEL BLUE 17" X 24"

## (undated) DEVICE — TUBING SET TUR BLADDER IRRIGATION Y-TYPE 81"

## (undated) DEVICE — SOL INJ NS 0.9% 500ML 1-PORT

## (undated) DEVICE — SOL IRR POUR H2O 500ML

## (undated) DEVICE — MYOSURE SCOPE SEAL

## (undated) DEVICE — DRAPE 1/2 SHEET 40X57"

## (undated) DEVICE — WARMING BLANKET UPPER ADULT

## (undated) DEVICE — SOL IRR SORBITOL 3% 3000ML

## (undated) DEVICE — VENODYNE/SCD SLEEVE CALF MEDIUM

## (undated) DEVICE — TUBING STRYKER HYSTEROSCOPY INFLOW OUTFLOW

## (undated) DEVICE — TUBING TUR 2 PRONG

## (undated) DEVICE — AVETA CORAL HYSTEROSCOPE 4.6MM DISP

## (undated) DEVICE — MYOSURE CANISTER AQUILEX KIT

## (undated) DEVICE — SOL IRR POUR NS 0.9% 1500ML

## (undated) DEVICE — LAP PAD W RING 18 X 18"

## (undated) DEVICE — PREP SCRUB BRUSH W CHG 4%

## (undated) DEVICE — URETERAL CATH RED RUBBER 16FR (ORANGE)

## (undated) DEVICE — AVETA FLUID MANAGEMENT ACCESSORY W CAP

## (undated) DEVICE — GOWN LG

## (undated) DEVICE — DRAPE LEGGINGS 6" CUFF 28X43"

## (undated) DEVICE — DRAPE LIGHT HANDLE COVER (BLUE)